# Patient Record
Sex: MALE | Race: WHITE | NOT HISPANIC OR LATINO | Employment: FULL TIME | URBAN - NONMETROPOLITAN AREA
[De-identification: names, ages, dates, MRNs, and addresses within clinical notes are randomized per-mention and may not be internally consistent; named-entity substitution may affect disease eponyms.]

---

## 2024-06-10 ENCOUNTER — PROCEDURE VISIT (OUTPATIENT)
Dept: OTOLARYNGOLOGY | Facility: CLINIC | Age: 34
End: 2024-06-10
Payer: COMMERCIAL

## 2024-06-10 ENCOUNTER — OFFICE VISIT (OUTPATIENT)
Dept: OTOLARYNGOLOGY | Facility: CLINIC | Age: 34
End: 2024-06-10
Payer: COMMERCIAL

## 2024-06-10 VITALS
BODY MASS INDEX: 30.24 KG/M2 | DIASTOLIC BLOOD PRESSURE: 91 MMHG | WEIGHT: 223.25 LBS | SYSTOLIC BLOOD PRESSURE: 131 MMHG | TEMPERATURE: 98.6 F | HEART RATE: 100 BPM | HEIGHT: 72 IN

## 2024-06-10 DIAGNOSIS — H93.8X3 EAR FULLNESS, BILATERAL: ICD-10-CM

## 2024-06-10 DIAGNOSIS — S03.00XA DISLOCATION OF TEMPOROMANDIBULAR JOINT, INITIAL ENCOUNTER: ICD-10-CM

## 2024-06-10 DIAGNOSIS — H93.13 TINNITUS, BILATERAL: Primary | ICD-10-CM

## 2024-06-10 DIAGNOSIS — Z01.10 HEARING WITHIN NORMAL LIMITS IN BOTH EARS: ICD-10-CM

## 2024-06-10 DIAGNOSIS — R42 DIZZINESS: ICD-10-CM

## 2024-06-10 PROCEDURE — 92557 COMPREHENSIVE HEARING TEST: CPT

## 2024-06-10 PROCEDURE — 99203 OFFICE O/P NEW LOW 30 MIN: CPT | Performed by: NURSE PRACTITIONER

## 2024-06-10 PROCEDURE — 92567 TYMPANOMETRY: CPT

## 2024-06-10 RX ORDER — PAROXETINE HYDROCHLORIDE 20 MG/1
TABLET, FILM COATED ORAL
COMMUNITY
Start: 2024-05-06

## 2024-06-10 RX ORDER — AZELASTINE 1 MG/ML
2 SPRAY, METERED NASAL 2 TIMES DAILY
Qty: 30 ML | Refills: 11 | Status: SHIPPED | OUTPATIENT
Start: 2024-06-10

## 2024-06-10 RX ORDER — FLUTICASONE PROPIONATE 50 MCG
2 SPRAY, SUSPENSION (ML) NASAL DAILY
Qty: 16 G | Refills: 11 | Status: SHIPPED | OUTPATIENT
Start: 2024-06-10

## 2024-06-10 NOTE — PROGRESS NOTES
YOB: 1990  Location: [facility]  Location Address: [address] [place] [address],  3, [address] [place] [address]  Location Phone: [phone]    Chief Complaint   Patient presents with    Tinnitus    Temporomandibular Joint Pain       History of Present Illness  King Calero is a 34 y.o. male.  King Calero is here for evaluation of ENT complaints. The patient has had problems with tinnitus and ear fullness. This is localized to both ears left > right.  He also admits intermittent dizziness. He denies room spinning with this.  He does admit jaw popping.    Reviewed:     AUDIOMETRIC EVALUATION        Name:  King Calero  :  1990  Age:  34 y.o.  Date of Evaluation:  06/10/2024         History:  Mr. Calero is seen today for a hearing evaluation due to tinnitus at the request of JAMIR Hong. Patient had an evaluation 2021 in [place] that revealed essentially normal hearing thresholds. Patient reports ringing has gotten worse with COVID.     Audiologic Information:  Concerns for Hearing: sensitive to sounds intermittently  PETs: No  Other otologic surgical history: No  Aural Pressure/Fullness: Left  Otalgia: Left  Otorrhea: No  Tinnitus: Constant bilateral ringing  Dizziness: No  Noise Exposure: shooting with hearing protection  Family history of hearing loss: Father, paternal grandparents, mother   Head trauma requiring hospital stay: No  Chemotherapy: No  Other significant history: TMJ     **Case history obtained in office and through EMR system        EVALUATION:          RESULTS:     Otoscopic Evaluation:  Right: minimal cerumen, tympanic membrane visualized  Left: partially occluding cerumen, tympanic membrane visualized     Tympanometry (226 Hz):  Right: Type As  Left: Type A     Pure Tone Audiometry:    Right: Normal hearing thresholds  Left: Normal hearing thresholds     Speech Audiometry:   Right: Speech Reception Threshold (SRT) was obtained at 15 dB HL  Word Recognition scores -  Excellent (100)% at 55 dB, using NU-6 List 3A with 10 words  Left: Speech Reception Threshold (SRT) was obtained at 20 dB HL  Word Recognition scores - Excellent (100)% at 60 dB, using NU-6 List 3A with 10 words  SRT/PTA in good agreement bilaterally.     IMPRESSIONS:  Tympanometry showed normal middle ear pressure and static compliance, consistent with normal middle ear function for the left ear. Tympanometry showed normal middle ear pressure with reduced static compliance, consistent with hypomobile tympanic membrane, for the right ear. Pure tone thresholds for both ears show normal hearing, suggesting normal outer/middle ear function and normal cochlear/retrocochlear function. Patient was counseled with regard to the findings.        Diagnosis:  1. Tinnitus, bilateral    2. Hearing within normal limits in both ears          RECOMMENDATIONS/PLAN:  Follow-up recommendations per JAMIR Hong.  Practice good communication strategies to assist with everyday listening (eye contact with speakers, reduce background noise, encourage others to communicate clearly and slowly).  Tinnitus management strategies. Consider use of amplification, a white noise machine, low level TV/radio, or fan at night to help mask the tinnitus. It is also recommended to avoid caffeine, alcohol, tobacco, salt, high dose NSAIDs, and to increase hydration. Additional resources: Resound Relief bhargavi and American Tinnitus Association (www.joe.org).  Consistent utilization of hearing protection devices in noisy environments.  Repeat hearing evaluation if changes in hearing are noted or concerns arise.  Discussed results and recommendations with patient. Questions were addressed and the patient was encouraged to contact our department should concerns arise.           Ivonne Galeas, CCC-DEE, F-AAA  Doctor of Audiology       Past Medical History:   Diagnosis Date    Anxiety     Depression        Past Surgical History:   Procedure Laterality  Date    SHOULDER SURGERY Right        Outpatient Medications Marked as Taking for the 6/10/24 encounter (Office Visit) with Kavon Mccauley APRN   Medication Sig Dispense Refill    PARoxetine (PAXIL) 20 MG tablet          Penicillins, Amoxicillin, Doxycycline, and Trazodone    History reviewed. No pertinent family history.    Social History     Socioeconomic History    Marital status: Single   Tobacco Use    Smoking status: Never    Smokeless tobacco: Never   Vaping Use    Vaping status: Never Used   Substance and Sexual Activity    Alcohol use: Yes     Comment: rarely    Drug use: Never    Sexual activity: Defer       Review of Systems   Constitutional: Negative.    HENT:  Positive for tinnitus.    Respiratory: Negative.     Neurological:  Positive for dizziness.       Vitals:    06/10/24 0916   BP: 131/91   Pulse: 100   Temp: 98.6 °F (37 °C)       Body mass index is 30.28 kg/m².    Objective     Physical Exam  Vitals reviewed.   Constitutional:       Appearance: Normal appearance. He is obese.   HENT:      Head: Normocephalic.      Comments: Left jaw popping noted     Right Ear: Tympanic membrane, ear canal and external ear normal.      Left Ear: Tympanic membrane, ear canal and external ear normal.      Nose: Nose normal.      Mouth/Throat:      Lips: Pink.      Mouth: Mucous membranes are moist.      Pharynx: Oropharynx is clear.   Neurological:      Mental Status: He is alert.   Psychiatric:         Behavior: Behavior is cooperative.         Assessment & Plan   Diagnoses and all orders for this visit:    1. Tinnitus, bilateral (Primary)  -     MRI Internal Auditory Canal With Wo; Future  -     MRI Brain With & Without Contrast; Future    2. Hearing within normal limits in both ears    3. Ear fullness, bilateral  -     MRI Internal Auditory Canal With Wo; Future  -     MRI Brain With & Without Contrast; Future    4. Dizziness  -     MRI Internal Auditory Canal With Wo; Future  -     MRI Brain With & Without  Contrast; Future    5. Dislocation of temporomandibular joint, initial encounter    Other orders  -     azelastine (ASTELIN) 0.1 % nasal spray; 2 sprays into the nostril(s) as directed by provider 2 (Two) Times a Day. Use in each nostril as directed  Dispense: 30 mL; Refill: 11  -     fluticasone (FLONASE) 50 MCG/ACT nasal spray; 2 sprays into the nostril(s) as directed by provider Daily.  Dispense: 16 g; Refill: 11      * Surgery not found *  Orders Placed This Encounter   Procedures    MRI Internal Auditory Canal With Wo     Standing Status:   Future     Standing Expiration Date:   6/10/2025     Scheduling Instructions:      Savana VALENZUELA     Order Specific Question:   Release to patient     Answer:   Routine Release [[id number]]     Order Specific Question:   Reason for Exam:     Answer:   tinnitus, dizziness    MRI Brain With & Without Contrast     Standing Status:   Future     Standing Expiration Date:   6/10/2025     Scheduling Instructions:      aSvana VALENZUELA     Order Specific Question:   Release to patient     Answer:   Routine Release [[id number]]     Order Specific Question:   Reason for Exam:     Answer:   dizziness, tinnnus     Temporomandibular joint disease was discussed at length.  I recommended a soft diet, prn NSAID's, an OTC bite block which can be obtained from a local pharmacy and Dental consult if this is not successful. I explained the nature of TMJ syndrome, treatment modalities and recommended a possible oral surgery evaluation for persistent problems or difficulties.     MRI of brain and IAC for dizziness and tinnitus  Return for problems    Return in about 3 months (around 9/10/2024) for Recheck.       Patient Instructions   TINNITUS  Tinnitus (latin for ringing) is the sensation of noise in the ears. This symptom can be quite variable and disconcerting. Most people have had ringing in the ears but most do not require treatment. Only 6% of people have ringing troubling enough to seek  treatment.    Symptoms can range from ringing to “noise” of any sort in the ear or ears. Timing can vary as well. There are no specific symptom requirements to have tinnitus. It is speculated that the inner ear hair cells (responsible for hearing) may be dying and causing the brain to seek additional input for sound that is missing. There are many theories for the etiology of tinnitus or ringing.    You may be referred for hearing testing or imaging of the ears/brain to try to determine what is causing ringing and how to best treat the condition.    Tinnitus Recommendations-  >Avoid loud or sudden noise  >Wear hearing protection in the presence of loud noise  >Avoid irritants like caffeine, nicotine, tonic water, malaria medications, alcohol, aspirin- please tell MD if you are taking any of these medications  >In a quiet environment or while sleeping, use a white noise generator or radio station to provide background noise  >Relaxation and stress management techniques are useful  >Biofeedback  >Complementary medicine may be of benefit  >Wear a hearing aid or tinnitus masker/retrainer  >Psychological counseling may be beneficial in some situations  >Exercise!!  >Restorative Sleep!!    Medical therapy for ringing (may include)-  Do nothing  Medications for ringing  IV medication  Ear perfusion- inner ear surgery to reduce ringing  Hearing Aids, Tinnitus maskers, Tinnitus retrainers  Biofeedback  Psychological counseling    All options will be reviewed at your visit. Treating tinnitus can be difficult and frustrating. There really is no cure but rather control. This can be time consuming to treat. The patient determines how much treatment is warranted if there are no associated medical conditions requiring therapy. Please be patient.     Temporomandibular joint disease was discussed at length.  I recommended a soft diet, prn NSAID's, an OTC bite block which can be obtained from a local pharmacy and Dental consult if  this is not successful. I explained the nature of TMJ syndrome, treatment modalities and recommended a possible oral surgery evaluation for persistent problems or difficulties.

## 2024-06-10 NOTE — PROGRESS NOTES
AUDIOMETRIC EVALUATION      Name:  King Calero  :  1990  Age:  34 y.o.  Date of Evaluation:  06/10/2024       History:  Mr. Calero is seen today for a hearing evaluation due to tinnitus at the request of JAMIR Hong. Patient had an evaluation 2021 in [place] that revealed essentially normal hearing thresholds. Patient reports ringing has gotten worse with COVID.    Audiologic Information:  Concerns for Hearing: sensitive to sounds intermittently  PETs: No  Other otologic surgical history: No  Aural Pressure/Fullness: Left  Otalgia: Left  Otorrhea: No  Tinnitus: Constant bilateral ringing  Dizziness: No  Noise Exposure: shooting with hearing protection  Family history of hearing loss: Father, paternal grandparents, mother   Head trauma requiring hospital stay: No  Chemotherapy: No  Other significant history: TMJ    **Case history obtained in office and through EMR system      EVALUATION:        RESULTS:    Otoscopic Evaluation:  Right: minimal cerumen, tympanic membrane visualized  Left: partially occluding cerumen, tympanic membrane visualized    Tympanometry (226 Hz):  Right: Type As  Left: Type A    Pure Tone Audiometry:    Right: Normal hearing thresholds  Left: Normal hearing thresholds    Speech Audiometry:   Right: Speech Reception Threshold (SRT) was obtained at 15 dB HL  Word Recognition scores - Excellent (100)% at 55 dB, using NU-6 List 3A with 10 words  Left: Speech Reception Threshold (SRT) was obtained at 20 dB HL  Word Recognition scores - Excellent (100)% at 60 dB, using NU-6 List 3A with 10 words  SRT/PTA in good agreement bilaterally.    IMPRESSIONS:  Tympanometry showed normal middle ear pressure and static compliance, consistent with normal middle ear function for the left ear. Tympanometry showed normal middle ear pressure with reduced static compliance, consistent with hypomobile tympanic membrane, for the right ear. Pure tone thresholds for both ears show normal hearing,  suggesting normal outer/middle ear function and normal cochlear/retrocochlear function. Patient was counseled with regard to the findings.      Diagnosis:  1. Tinnitus, bilateral    2. Hearing within normal limits in both ears         RECOMMENDATIONS/PLAN:  Follow-up recommendations per JAMIR Hong.  Practice good communication strategies to assist with everyday listening (eye contact with speakers, reduce background noise, encourage others to communicate clearly and slowly).  Tinnitus management strategies. Consider use of amplification, a white noise machine, low level TV/radio, or fan at night to help mask the tinnitus. It is also recommended to avoid caffeine, alcohol, tobacco, salt, high dose NSAIDs, and to increase hydration. Additional resources: [employer] Medical Relief bhargavi and American Tinnitus Association (www.joe.org).  Consistent utilization of hearing protection devices in noisy environments.  Repeat hearing evaluation if changes in hearing are noted or concerns arise.  Discussed results and recommendations with patient. Questions were addressed and the patient was encouraged to contact our department should concerns arise.        Ivonne Galeas, CCC-A, F-AAA  Doctor of Audiology

## 2024-06-10 NOTE — PATIENT INSTRUCTIONS
TINNITUS  Tinnitus (latin for ringing) is the sensation of noise in the ears. This symptom can be quite variable and disconcerting. Most people have had ringing in the ears but most do not require treatment. Only 6% of people have ringing troubling enough to seek treatment.    Symptoms can range from ringing to “noise” of any sort in the ear or ears. Timing can vary as well. There are no specific symptom requirements to have tinnitus. It is speculated that the inner ear hair cells (responsible for hearing) may be dying and causing the brain to seek additional input for sound that is missing. There are many theories for the etiology of tinnitus or ringing.    You may be referred for hearing testing or imaging of the ears/brain to try to determine what is causing ringing and how to best treat the condition.    Tinnitus Recommendations-  >Avoid loud or sudden noise  >Wear hearing protection in the presence of loud noise  >Avoid irritants like caffeine, nicotine, tonic water, malaria medications, alcohol, aspirin- please tell MD if you are taking any of these medications  >In a quiet environment or while sleeping, use a white noise generator or radio station to provide background noise  >Relaxation and stress management techniques are useful  >Biofeedback  >Complementary medicine may be of benefit  >Wear a hearing aid or tinnitus masker/retrainer  >Psychological counseling may be beneficial in some situations  >Exercise!!  >Restorative Sleep!!    Medical therapy for ringing (may include)-  Do nothing  Medications for ringing  IV medication  Ear perfusion- inner ear surgery to reduce ringing  Hearing Aids, Tinnitus maskers, Tinnitus retrainers  Biofeedback  Psychological counseling    All options will be reviewed at your visit. Treating tinnitus can be difficult and frustrating. There really is no cure but rather control. This can be time consuming to treat. The patient determines how much treatment is warranted if there  are no associated medical conditions requiring therapy. Please be patient.     Temporomandibular joint disease was discussed at length.  I recommended a soft diet, prn NSAID's, an OTC bite block which can be obtained from a local pharmacy and Dental consult if this is not successful. I explained the nature of TMJ syndrome, treatment modalities and recommended a possible oral surgery evaluation for persistent problems or difficulties.

## 2024-07-17 ENCOUNTER — TELEPHONE (OUTPATIENT)
Dept: OTOLARYNGOLOGY | Facility: CLINIC | Age: 34
End: 2024-07-17

## 2024-07-17 NOTE — TELEPHONE ENCOUNTER
Caller: OBDULIO PRAJAPATI    Relationship: SELF    Best call back number: [phone]    What orders are you requesting (i.e. lab or imaging): PLEASE UPDATE THE MRI ORDERS IN THE SYSTEM, PATIENT CALLED TO GET THIS TESTING SCHEDULED.

## 2024-07-17 NOTE — TELEPHONE ENCOUNTER
Provider: JAMIR GRANGER    The [facility] received a fax that requires your attention. The document has been indexed to the patient's chart for your review.     Reason for sending: REVIEW     Documents Description: AUTHORIZATION DENIAL NOTICE     Name of Sender: [facility]     Date Indexed: 07/17/24    Notes (if needed): INDEXED INTO CHART AS EXT MED RECS 7/12/24.

## 2024-09-13 ENCOUNTER — TELEPHONE (OUTPATIENT)
Dept: OTOLARYNGOLOGY | Facility: CLINIC | Age: 34
End: 2024-09-13
Payer: COMMERCIAL

## 2024-09-23 ENCOUNTER — TELEPHONE (OUTPATIENT)
Dept: OTOLARYNGOLOGY | Facility: CLINIC | Age: 34
End: 2024-09-23

## 2024-09-23 NOTE — TELEPHONE ENCOUNTER
Caller:    Relationship:      Best call back number:    PATIENT CALLED REQUESTING TO CANCEL SAME DAY APPT.    Did the patient call AFTER the start time of their scheduled appointment?   NO    Was the patient's appointment rescheduled?  YES

## 2024-10-15 ENCOUNTER — TELEPHONE (OUTPATIENT)
Dept: SURGERY | Facility: CLINIC | Age: 34
End: 2024-10-15
Payer: COMMERCIAL

## 2024-10-15 NOTE — TELEPHONE ENCOUNTER
Attempted to contact PT to schedule for their referral. PT voicemail is full so I couldn't not leave a message at this time.    CBC  10/15

## 2024-10-23 NOTE — PROGRESS NOTES
[facility] History and Physical  King Calero  MRN: [id number]  Age: 34 y.o. male     YOB: 1990    Primary   Problem      - pilonidal cyst/abscess    SUBJECTIVE  History  of Presenting Illness   Patient is a 34 y.o. male with pertinent past medical history of anxiety depression who was seen by dermatology and found to have a pilonidal cyst that is recurrently and chronically infected, presenting for evaluation for potential abscess drainage versus cyst excision.  He reports intermittent infection, swelling, symptoms of pain when it is infected, and that this has been present for several years.  He has had antibiotics for it, but has not tried chemical hair removal or any other interventions.          Past Medical History     Past Medical History:  Past Medical History:   Diagnosis Date    Anxiety     Depression        PCP: Elvin Garcia MD    Past Surgical History:  Past Surgical History:   Procedure Laterality Date    SHOULDER SURGERY Right        Family History:  History reviewed. No pertinent family history.    Social History:  Social History     Tobacco Use    Smoking status: Never    Smokeless tobacco: Never   Substance Use Topics    Alcohol use: Yes     Comment: occ       Allergies:  Allergies   Allergen Reactions    Penicillins GI Intolerance    Amoxicillin Diarrhea and Nausea Only    Doxycycline Other (See Comments)     States it made his ears ring loudly- Tinnitus worse    Trazodone Cough       Medications:  Current Outpatient Medications   Medication Instructions    azelastine (ASTELIN) 0.1 % nasal spray 2 sprays, Nasal, 2 Times Daily, Use in each nostril as directed    clobetasol propionate (TEMOVATE) 0.05 % cream APPLY TO THE AFFECTED AREAS OF PSORIASIS 2-3 TIMES WEEKLY AS DIRECTED AS NEEDED FOR FLARES    fluticasone (FLONASE) 50 MCG/ACT nasal spray 2 sprays, Nasal, Daily    ketoconazole (NIZORAL) 2 % shampoo WASH SCALP THREE TIMES WEEKLY IN THE SHOWER. LEAVE  "ON FOR 5 MINUTES THEN RINSE OFF    PARoxetine (PAXIL) 20 MG tablet            Review of Systems   He otherwisedenies lightheadedness, dizziness, fainting, passing out, headache, nausea, vomiting, chest pain, palpitations, shortness of breath, coughing, wheezing, heart burn, reflux, skin lesions, unintended weight loss/gain, sensitivity to heat/cold, changes in sensation, changes in vision/hearing/smell/taste, myalgias, arthralgias, and has no other acute complaints or concerning symptoms to report at this time.      Physical Examination     Vitals:    10/24/24 1521   BP: 130/95   BP Location: Left arm   Patient Position: Sitting   Cuff Size: Large Adult   Pulse: 105   SpO2: 98%   Weight: 104 kg (229 lb 9.6 oz)   Height: 182.9 cm (72.01\")       Estimated body mass index is 31.13 kg/m² as calculated from the following:    Height as of this encounter: 182.9 cm (72.01\").    Weight as of this encounter: 104 kg (229 lb 9.6 oz).  PREVIOUS WEIGHTS:   Wt Readings from Last 5 Encounters:   10/24/24 104 kg (229 lb 9.6 oz)   06/10/24 101 kg (223 lb 4 oz)       Physical Examination:  Gen: awake, alert, sitting up in chair in no acute distress  HEENT: NCAT, EOMI, anicteric sclerae  CV: RRR, normotensive  Resp: nonlabored on room air, sats appropriate  INTEG: Small sinus to patient right of gluteal cleft, 2 draining sinuses in the gluteal cleft inferior to the initial area, no evidence of infection, no overlying erythema or induration, no area of fluctuance  MSK: moves all four, no c/c/e  Neuro: no focal deficits, cranial nerves grossly intact  Psy:  appropriate, cooperative      Data Review   No current labs or imaging to review  Problem List     Patient Active Problem List   Diagnosis    Pilonidal cyst            Assessment/Plan   King Calero is a 34 y.o. male with a pilonidal cyst.  It is not currently infected.  He would like to proceed with pilonidal cystectomy, which I have recommended.  Will plan to do this in a Gips " fashion, and I counseled him extensively regarding the risk of surgery, as well as the risk of recurrence.  I also counseled him to start chemical hair removal around the gluteal cleft, and informed him that he will need to plan to continue this for the rest of his life.      Smoking cessation counseling: Never smoker-no cessation counseling indicated  BMI counseling: BMI is >= 30 and <35. (Class 1 Obesity). The following options were offered after discussion;: weight loss educational material (shared in after visit summary), exercise counseling/recommendations, and nutrition counseling/recommendations  Med rec complete:yes  VTE: not indicated    Time Spent: I spent 30 minutes caring for King Calero on this date of service. This time includes time, independent of any procedures, spent by me in the following activities: preparing for the clinic visit, reviewing tests, obtaining and/or reviewing a separately obtained history, performing a medically appropriate examination and/or evaluation, counseling and educating the patient/family/caregiver, ordering medications, tests, or procedures, and documenting information in the medical record.     Chilo Mejia MD   10/24/2024   15:58 CDT

## 2024-10-24 ENCOUNTER — OFFICE VISIT (OUTPATIENT)
Dept: SURGERY | Facility: CLINIC | Age: 34
End: 2024-10-24
Payer: COMMERCIAL

## 2024-10-24 VITALS
SYSTOLIC BLOOD PRESSURE: 130 MMHG | DIASTOLIC BLOOD PRESSURE: 95 MMHG | BODY MASS INDEX: 31.1 KG/M2 | WEIGHT: 229.6 LBS | HEART RATE: 105 BPM | OXYGEN SATURATION: 98 % | HEIGHT: 72 IN

## 2024-10-24 DIAGNOSIS — L05.91 PILONIDAL CYST: Primary | ICD-10-CM

## 2024-10-24 RX ORDER — SODIUM CHLORIDE 0.9 % (FLUSH) 0.9 %
10 SYRINGE (ML) INJECTION AS NEEDED
OUTPATIENT
Start: 2024-10-24

## 2024-10-24 RX ORDER — KETOCONAZOLE 20 MG/ML
SHAMPOO TOPICAL
COMMUNITY

## 2024-10-24 RX ORDER — CLOBETASOL PROPIONATE 0.5 MG/G
CREAM TOPICAL
COMMUNITY

## 2024-10-24 RX ORDER — SODIUM CHLORIDE 0.9 % (FLUSH) 0.9 %
10 SYRINGE (ML) INJECTION EVERY 12 HOURS SCHEDULED
OUTPATIENT
Start: 2024-10-24

## 2024-10-24 RX ORDER — SODIUM CHLORIDE 9 MG/ML
40 INJECTION, SOLUTION INTRAVENOUS AS NEEDED
OUTPATIENT
Start: 2024-10-24 | End: 2024-10-24

## 2024-10-24 NOTE — LETTER
October 24, 2024     Elvin Garcia MD  [address] [place] [address] [place] [address]    Patient: King Calero   YOB: 1990   Date of Visit: 10/24/2024     Dear Elvin Garcia MD:       I have seen King Coleman in my office in consultation for his pilonidal cyst.  It is not currently infected, does not need incision and drainage at this time, but I did recommend pilonidal cystectomy to him as the only way to prevent future recurrence of pilonidal abscess.  He is in agreement, and we will plan to do this sometime in the next couple of months.    Should you have any questions or concerns about his care, please feel free to reach out; I look forward to following King along with you.         Sincerely,        Chilo Mejia MD        CC: No Recipients    Chilo Mejia MD  10/24/24 1559  Sign when Signing Visit    [facility] History and Physical  King Calero  MRN: [id number]  Age: 34 y.o. male     YOB: 1990    Primary   Problem      - pilonidal cyst/abscess    SUBJECTIVE  History  of Presenting Illness   Patient is a 34 y.o. male with pertinent past medical history of anxiety depression who was seen by dermatology and found to have a pilonidal cyst that is recurrently and chronically infected, presenting for evaluation for potential abscess drainage versus cyst excision.  He reports intermittent infection, swelling, symptoms of pain when it is infected, and that this has been present for several years.  He has had antibiotics for it, but has not tried chemical hair removal or any other interventions.          Past Medical History     Past Medical History:  Past Medical History:   Diagnosis Date   • Anxiety    • Depression        PCP: Elvin Garcia MD    Past Surgical History:  Past Surgical History:   Procedure Laterality Date   • SHOULDER SURGERY Right        Family History:  History reviewed. No pertinent family history.    Social History:  Social History  "    Tobacco Use   • Smoking status: Never   • Smokeless tobacco: Never   Substance Use Topics   • Alcohol use: Yes     Comment: occ       Allergies:  Allergies   Allergen Reactions   • Penicillins GI Intolerance   • Amoxicillin Diarrhea and Nausea Only   • Doxycycline Other (See Comments)     States it made his ears ring loudly- Tinnitus worse   • Trazodone Cough       Medications:  Current Outpatient Medications   Medication Instructions   • azelastine (ASTELIN) 0.1 % nasal spray 2 sprays, Nasal, 2 Times Daily, Use in each nostril as directed   • clobetasol propionate (TEMOVATE) 0.05 % cream APPLY TO THE AFFECTED AREAS OF PSORIASIS 2-3 TIMES WEEKLY AS DIRECTED AS NEEDED FOR FLARES   • fluticasone (FLONASE) 50 MCG/ACT nasal spray 2 sprays, Nasal, Daily   • ketoconazole (NIZORAL) 2 % shampoo WASH SCALP THREE TIMES WEEKLY IN THE SHOWER. LEAVE ON FOR 5 MINUTES THEN RINSE OFF   • PARoxetine (PAXIL) 20 MG tablet            Review of Systems   He otherwisedenies lightheadedness, dizziness, fainting, passing out, headache, nausea, vomiting, chest pain, palpitations, shortness of breath, coughing, wheezing, heart burn, reflux, skin lesions, unintended weight loss/gain, sensitivity to heat/cold, changes in sensation, changes in vision/hearing/smell/taste, myalgias, arthralgias, and has no other acute complaints or concerning symptoms to report at this time.      Physical Examination     Vitals:    10/24/24 1521   BP: 130/95   BP Location: Left arm   Patient Position: Sitting   Cuff Size: Large Adult   Pulse: 105   SpO2: 98%   Weight: 104 kg (229 lb 9.6 oz)   Height: 182.9 cm (72.01\")       Estimated body mass index is 31.13 kg/m² as calculated from the following:    Height as of this encounter: 182.9 cm (72.01\").    Weight as of this encounter: 104 kg (229 lb 9.6 oz).  PREVIOUS WEIGHTS:   Wt Readings from Last 5 Encounters:   10/24/24 104 kg (229 lb 9.6 oz)   06/10/24 101 kg (223 lb 4 oz)       Physical Examination:  Gen: " awake, alert, sitting up in chair in no acute distress  HEENT: NCAT, EOMI, anicteric sclerae  CV: RRR, normotensive  Resp: nonlabored on room air, sats appropriate  INTEG: Small sinus to patient right of gluteal cleft, 2 draining sinuses in the gluteal cleft inferior to the initial area, no evidence of infection, no overlying erythema or induration, no area of fluctuance  MSK: moves all four, no c/c/e  Neuro: no focal deficits, cranial nerves grossly intact  Psy:  appropriate, cooperative      Data Review   No current labs or imaging to review  Problem List     Patient Active Problem List   Diagnosis   • Pilonidal cyst            Assessment/Plan   King Calero is a 34 y.o. male with a pilonidal cyst.  It is not currently infected.  He would like to proceed with pilonidal cystectomy, which I have recommended.  Will plan to do this in a Gips fashion, and I counseled him extensively regarding the risk of surgery, as well as the risk of recurrence.  I also counseled him to start chemical hair removal around the gluteal cleft, and informed him that he will need to plan to continue this for the rest of his life.      Smoking cessation counseling: Never smoker-no cessation counseling indicated  BMI counseling: BMI is >= 30 and <35. (Class 1 Obesity). The following options were offered after discussion;: weight loss educational material (shared in after visit summary), exercise counseling/recommendations, and nutrition counseling/recommendations  Med rec complete:yes  VTE: not indicated    Time Spent: I spent 30 minutes caring for King Calero on this date of service. This time includes time, independent of any procedures, spent by me in the following activities: preparing for the clinic visit, reviewing tests, obtaining and/or reviewing a separately obtained history, performing a medically appropriate examination and/or evaluation, counseling and educating the patient/family/caregiver, ordering medications, tests, or  procedures, and documenting information in the medical record.     Chilo Mejia MD   10/24/2024   15:58 CDT

## 2024-10-24 NOTE — PATIENT INSTRUCTIONS
General Surgery Clinic Discharge Instructions      King Page  10/24/24      Diagnosis: Pilonidal cyst    Medications/Treatments:   (Take all medications as prescribed by your provider)  You should start removing all of the hair around the gluteal cleft (the affected area) with a chemical hair removal agent like Graham  Should this get inflamed on you should take sitz bath's, which is a hot water bath with no additives for 15 to 20 minutes a day        Surgical Plan: Will plan for surgery the first or second week of December      Follow-up: Pending above      Please call our office at [phone] with any issues, questions, or concerns.        Chilo Mejia MD  10/24/24

## 2024-11-18 ENCOUNTER — OFFICE VISIT (OUTPATIENT)
Dept: OTOLARYNGOLOGY | Facility: CLINIC | Age: 34
End: 2024-11-18
Payer: COMMERCIAL

## 2024-11-18 VITALS
HEIGHT: 72 IN | WEIGHT: 228 LBS | SYSTOLIC BLOOD PRESSURE: 160 MMHG | HEART RATE: 79 BPM | BODY MASS INDEX: 30.88 KG/M2 | DIASTOLIC BLOOD PRESSURE: 84 MMHG | TEMPERATURE: 97.7 F

## 2024-11-18 DIAGNOSIS — H91.8X3 ASYMMETRICAL HEARING LOSS: ICD-10-CM

## 2024-11-18 DIAGNOSIS — H93.13 TINNITUS, BILATERAL: Primary | ICD-10-CM

## 2024-11-18 PROCEDURE — 99213 OFFICE O/P EST LOW 20 MIN: CPT | Performed by: NURSE PRACTITIONER

## 2024-11-18 NOTE — PROGRESS NOTES
YOB: 1990  Location: [facility]  Location Address: [address] [place] [address], [facility], [address] [place] [address]  Location Phone: [phone]    Chief Complaint   Patient presents with    Tinnitus       History of Present Illness  King Calero is a 34 y.o. male.      History of Present Illness  The patient presents for evaluation of tinnitus.    He reports that the ringing in his ears has been somewhat alleviated since his last visit when his ears were cleaned. The condition has been manageable, although he experienced a slight increase in symptoms this week. However, the severity has lessened over the past two days. The tinnitus is present in both ears, with the left ear being more affected.    [facility]    He is not experiencing any dizziness. He also reports no issues with his nose or throat. He has not been using nasal sprays but has a sufficient supply of Flonase and Astelin.    Results          Past Medical History:   Diagnosis Date    Anxiety     Depression        Past Surgical History:   Procedure Laterality Date    SHOULDER SURGERY Right        Outpatient Medications Marked as Taking for the 24 encounter (Office Visit) with Kavon Mccauley APRN   Medication Sig Dispense Refill    PARoxetine (PAXIL) 20 MG tablet          Penicillins, Amoxicillin, Doxycycline, and Trazodone    History reviewed. No pertinent family history.    Social History     Socioeconomic History    Marital status: Single   Tobacco Use    Smoking status: Never    Smokeless tobacco: Never   Vaping Use    Vaping status: Never Used   Substance and Sexual Activity    Alcohol use: Yes     Comment: occ    Drug use: Never    Sexual activity: Defer       Review of Systems   Constitutional: Negative.    HENT:  Positive for tinnitus.        Vitals:    24 1511   BP: 160/84   Pulse: 79   Temp: 97.7 °F (36.5 °C)       Body mass index is 30.91 kg/m².    Objective     Physical  Exam      Physical Exam  Vitals reviewed.   Constitutional:       Appearance: Normal appearance. He is obese.   HENT:      Head: Normocephalic.      Right Ear: Tympanic membrane, ear canal and external ear normal.      Left Ear: Tympanic membrane, ear canal and external ear normal.      Nose: Nose normal.      Mouth/Throat:      Lips: Pink.      Mouth: Mucous membranes are moist.      Pharynx: Oropharynx is clear.   Neurological:      Mental Status: He is alert.   Psychiatric:         Behavior: Behavior is cooperative.           Assessment & Plan   Diagnoses and all orders for this visit:    1. Tinnitus, bilateral (Primary)  -     MRI Internal Auditory Canal With Wo; Future    2. Asymmetrical hearing loss  -     MRI Internal Auditory Canal With Wo; Future      * Surgery not found *  Orders Placed This Encounter   Procedures    MRI Internal Auditory Canal With Wo     Standing Status:   Future     Standing Expiration Date:   11/18/2025     Order Specific Question:   Release to patient     Answer:   Routine Release [[id number]]     Order Specific Question:   Reason for Exam:     Answer:   tinnitus, both ears     Assessment & Plan  1. Tinnitus.  An MRI of the internal auditory structures will be ordered. He is advised to contact the office should there be any changes in his symptoms. A hearing test will be scheduled for the summer to facilitate yearly hearing exams. Various treatment options for tinnitus were discussed, including the use of a hearing aid, which may be beneficial given the greater impact on his left ear. Tinnitus masking techniques were discussed. He is advised to continue using Flonase and Astelin as needed for additional support.    Follow-up  Return in 7 months for a hearing test.    Return in about 7 months (around 6/18/2025) for Recheck, with audio.       Patient Instructions   TINNITUS  Tinnitus (latin for ringing) is the sensation of noise in the ears. This symptom can be quite variable and  disconcerting. Most people have had ringing in the ears but most do not require treatment. Only 6% of people have ringing troubling enough to seek treatment.    Symptoms can range from ringing to “noise” of any sort in the ear or ears. Timing can vary as well. There are no specific symptom requirements to have tinnitus. It is speculated that the inner ear hair cells (responsible for hearing) may be dying and causing the brain to seek additional input for sound that is missing. There are many theories for the etiology of tinnitus or ringing.    You may be referred for hearing testing or imaging of the ears/brain to try to determine what is causing ringing and how to best treat the condition.    Tinnitus Recommendations-  >Avoid loud or sudden noise  >Wear hearing protection in the presence of loud noise  >Avoid irritants like caffeine, nicotine, tonic water, malaria medications, alcohol, aspirin- please tell MD if you are taking any of these medications  >In a quiet environment or while sleeping, use a white noise generator or radio station to provide background noise  >Relaxation and stress management techniques are useful  >Biofeedback  >Complementary medicine may be of benefit  >Wear a hearing aid or tinnitus masker/retrainer  >Psychological counseling may be beneficial in some situations  >Exercise!!  >Restorative Sleep!!    Medical therapy for ringing (may include)-  Do nothing  Medications for ringing  IV medication  Ear perfusion- inner ear surgery to reduce ringing  Hearing Aids, Tinnitus maskers, Tinnitus retrainers  Biofeedback  Psychological counseling    All options will be reviewed at your visit. Treating tinnitus can be difficult and frustrating. There really is no cure but rather control. This can be time consuming to treat. The patient determines how much treatment is warranted if there are no associated medical conditions requiring therapy. Please be patient.       Patient or patient representative  verbalized consent for the use of Ambient Listening during the visit with  JAMIR Gustafson for chart documentation. 11/18/2024  15:41 CST

## 2025-05-27 ENCOUNTER — TELEPHONE (OUTPATIENT)
Dept: OTOLARYNGOLOGY | Facility: CLINIC | Age: 35
End: 2025-05-27
Payer: COMMERCIAL